# Patient Record
Sex: FEMALE | Race: WHITE | NOT HISPANIC OR LATINO | Employment: FULL TIME | URBAN - METROPOLITAN AREA
[De-identification: names, ages, dates, MRNs, and addresses within clinical notes are randomized per-mention and may not be internally consistent; named-entity substitution may affect disease eponyms.]

---

## 2022-01-27 ENCOUNTER — OFFICE VISIT (OUTPATIENT)
Dept: OBSTETRICS AND GYNECOLOGY | Facility: CLINIC | Age: 57
End: 2022-01-27
Attending: OBSTETRICS & GYNECOLOGY
Payer: COMMERCIAL

## 2022-01-27 ENCOUNTER — LAB VISIT (OUTPATIENT)
Dept: LAB | Facility: HOSPITAL | Age: 57
End: 2022-01-27
Attending: OBSTETRICS & GYNECOLOGY
Payer: COMMERCIAL

## 2022-01-27 VITALS
BODY MASS INDEX: 22.27 KG/M2 | WEIGHT: 125.69 LBS | HEIGHT: 63 IN | DIASTOLIC BLOOD PRESSURE: 72 MMHG | SYSTOLIC BLOOD PRESSURE: 112 MMHG

## 2022-01-27 DIAGNOSIS — Z13.21 ENCOUNTER FOR VITAMIN DEFICIENCY SCREENING: ICD-10-CM

## 2022-01-27 DIAGNOSIS — Z01.419 ENCOUNTER FOR GYNECOLOGICAL EXAMINATION: Primary | ICD-10-CM

## 2022-01-27 DIAGNOSIS — Z78.0 MENOPAUSE: ICD-10-CM

## 2022-01-27 DIAGNOSIS — R53.83 FATIGUE, UNSPECIFIED TYPE: ICD-10-CM

## 2022-01-27 LAB
DHEA-S SERPL-MCNC: 245 UG/DL (ref 29.7–182.2)
ESTRADIOL SERPL-MCNC: <10 PG/ML
FSH SERPL-ACNC: 70.68 MIU/ML
PROGEST SERPL-MCNC: 0.2 NG/ML
TESTOST SERPL-MCNC: 25 NG/DL (ref 5–73)
VIT B12 SERPL-MCNC: 769 PG/ML (ref 210–950)

## 2022-01-27 PROCEDURE — 82607 VITAMIN B-12: CPT | Performed by: OBSTETRICS & GYNECOLOGY

## 2022-01-27 PROCEDURE — 84403 ASSAY OF TOTAL TESTOSTERONE: CPT | Performed by: OBSTETRICS & GYNECOLOGY

## 2022-01-27 PROCEDURE — 83001 ASSAY OF GONADOTROPIN (FSH): CPT | Performed by: OBSTETRICS & GYNECOLOGY

## 2022-01-27 PROCEDURE — 99999 PR PBB SHADOW E&M-NEW PATIENT-LVL III: CPT | Mod: PBBFAC,,, | Performed by: OBSTETRICS & GYNECOLOGY

## 2022-01-27 PROCEDURE — 84402 ASSAY OF FREE TESTOSTERONE: CPT | Performed by: OBSTETRICS & GYNECOLOGY

## 2022-01-27 PROCEDURE — 3008F PR BODY MASS INDEX (BMI) DOCUMENTED: ICD-10-PCS | Mod: CPTII,S$GLB,, | Performed by: OBSTETRICS & GYNECOLOGY

## 2022-01-27 PROCEDURE — 3078F DIAST BP <80 MM HG: CPT | Mod: CPTII,S$GLB,, | Performed by: OBSTETRICS & GYNECOLOGY

## 2022-01-27 PROCEDURE — 3078F PR MOST RECENT DIASTOLIC BLOOD PRESSURE < 80 MM HG: ICD-10-PCS | Mod: CPTII,S$GLB,, | Performed by: OBSTETRICS & GYNECOLOGY

## 2022-01-27 PROCEDURE — 1159F PR MEDICATION LIST DOCUMENTED IN MEDICAL RECORD: ICD-10-PCS | Mod: CPTII,S$GLB,, | Performed by: OBSTETRICS & GYNECOLOGY

## 2022-01-27 PROCEDURE — 3074F PR MOST RECENT SYSTOLIC BLOOD PRESSURE < 130 MM HG: ICD-10-PCS | Mod: CPTII,S$GLB,, | Performed by: OBSTETRICS & GYNECOLOGY

## 2022-01-27 PROCEDURE — 3008F BODY MASS INDEX DOCD: CPT | Mod: CPTII,S$GLB,, | Performed by: OBSTETRICS & GYNECOLOGY

## 2022-01-27 PROCEDURE — 84144 ASSAY OF PROGESTERONE: CPT | Performed by: OBSTETRICS & GYNECOLOGY

## 2022-01-27 PROCEDURE — 82627 DEHYDROEPIANDROSTERONE: CPT | Performed by: OBSTETRICS & GYNECOLOGY

## 2022-01-27 PROCEDURE — 3074F SYST BP LT 130 MM HG: CPT | Mod: CPTII,S$GLB,, | Performed by: OBSTETRICS & GYNECOLOGY

## 2022-01-27 PROCEDURE — 1159F MED LIST DOCD IN RCRD: CPT | Mod: CPTII,S$GLB,, | Performed by: OBSTETRICS & GYNECOLOGY

## 2022-01-27 PROCEDURE — 82670 ASSAY OF TOTAL ESTRADIOL: CPT | Performed by: OBSTETRICS & GYNECOLOGY

## 2022-01-27 PROCEDURE — 99204 OFFICE O/P NEW MOD 45 MIN: CPT | Mod: S$GLB,,, | Performed by: OBSTETRICS & GYNECOLOGY

## 2022-01-27 PROCEDURE — 99204 PR OFFICE/OUTPT VISIT, NEW, LEVL IV, 45-59 MIN: ICD-10-PCS | Mod: S$GLB,,, | Performed by: OBSTETRICS & GYNECOLOGY

## 2022-01-27 PROCEDURE — 99999 PR PBB SHADOW E&M-NEW PATIENT-LVL III: ICD-10-PCS | Mod: PBBFAC,,, | Performed by: OBSTETRICS & GYNECOLOGY

## 2022-01-27 RX ORDER — PROGESTERONE 100 MG/1
CAPSULE ORAL
Qty: 90 CAPSULE | Refills: 3 | Status: SHIPPED | OUTPATIENT
Start: 2022-01-27 | End: 2022-05-23

## 2022-01-27 RX ORDER — ESTRADIOL 1 MG/1
1 TABLET ORAL EVERY MORNING
Qty: 90 TABLET | Refills: 3 | Status: SHIPPED | OUTPATIENT
Start: 2022-01-27 | End: 2022-05-23

## 2022-01-27 RX ORDER — VILAZODONE HYDROCHLORIDE 40 MG/1
40 TABLET ORAL DAILY
COMMUNITY
Start: 2022-01-19

## 2022-01-27 RX ORDER — TRAZODONE HYDROCHLORIDE 150 MG/1
150 TABLET ORAL NIGHTLY
COMMUNITY
Start: 2021-12-14 | End: 2022-05-23

## 2022-01-27 RX ORDER — BREXPIPRAZOLE 2 MG/1
1 TABLET ORAL NIGHTLY
COMMUNITY
Start: 2022-01-17 | End: 2022-05-23

## 2022-01-27 RX ORDER — ZOLPIDEM TARTRATE 10 MG/1
10 TABLET ORAL NIGHTLY PRN
COMMUNITY
Start: 2022-01-15

## 2022-01-27 RX ORDER — ALPRAZOLAM 0.5 MG/1
0.5 TABLET ORAL DAILY PRN
COMMUNITY
Start: 2022-01-15 | End: 2022-05-23

## 2022-01-27 RX ORDER — LEVOTHYROXINE SODIUM 88 UG/1
88 TABLET ORAL DAILY
COMMUNITY
Start: 2022-01-15

## 2022-01-27 RX ORDER — BUPROPION HYDROCHLORIDE 300 MG/1
300 TABLET ORAL DAILY
COMMUNITY
Start: 2022-01-19 | End: 2022-05-23

## 2022-01-27 RX ORDER — ASCORBIC ACID 1000 MG
TABLET ORAL
COMMUNITY

## 2022-01-27 NOTE — PROGRESS NOTES
Subjective:      Andie Kumari is a 56 y.o. female who presents to discuss hormone replacement therapy.  Menarche occurred at age 12 and the patient has a Mirena so uncertain if in menopause. Patient is requesting hormone replacement therapy due to moodiness and no energy, anxiety, decreased libido, depression, memory loss, difficulty focusing, word find problems, and brain fog.  The patient is not currently sexually active.  She denies the following contraindications to HRT:  Vaginal bleeding, history of VTE/PE, thrombophilia,  breast cancer, or active liver disease.   She takes a multivitamin.    PCP: Dr. Owen       Routine labs: 9/2021 - normal per pt  Pap smear: 2021 Normal per pt  Mammogram: 1/2022 Normal   DEXA: 1/2022 Normal  Colonoscopy: 2021 Normal per pt    No visits with results within 3 Month(s) from this visit.   Latest known visit with results is:   No results found for any previous visit.       Past Medical History:   Diagnosis Date    Anxiety     Contraception, device intrauterine 11/18/2014    Mirena     Depression     Encounter for gynecological examination 09/2021    PCP - Dr Francia Owen     H/O mammogram 01/12/2022    Normal @ [facility]     Hx of bone density study 01/2022    Normal @ [facility]     Insomnia     Pap smear for cervical cancer screening 2021    Negative w/ Dr Owen     Thyroid disease      Past Surgical History:   Procedure Laterality Date    COLONOSCOPY  2021    Normal w/ Dr Hightower (10 yrs)     CORRECTION OF HAMMER TOE Right 2002    VAGINAL DELIVERY  2009     Social History     Tobacco Use    Smoking status: Never Smoker    Smokeless tobacco: Never Used   Substance Use Topics    Alcohol use: Yes     Comment: social     Drug use: Never     Family History   Problem Relation Age of Onset    Lung cancer Father     No Known Problems Mother     Suicide Sister     No Known Problems Daughter     Depression Paternal Grandfather     Depression Paternal Grandmother     No  "Known Problems Maternal Grandmother     No Known Problems Maternal Grandfather     Breast cancer Neg Hx     Colon cancer Neg Hx     Ovarian cancer Neg Hx      OB History    Para Term  AB Living   1 1 1     1   SAB IAB Ectopic Multiple Live Births           1      # Outcome Date GA Lbr Johann/2nd Weight Sex Delivery Anes PTL Lv   1 Term  40w0d  3.232 kg (7 lb 2 oz) F Vag-Spont   [facility]A      Obstetric Comments   Menarche 12        Current Outpatient Medications:     buPROPion (WELLBUTRIN XL) 300 MG 24 hr tablet, Take 300 mg by mouth once daily., Disp: , Rfl:     docosahexaenoic acid/epa (FISH OIL ORAL), Take by mouth., Disp: , Rfl:     ginkgo biloba 40 mg Tab, Take by mouth., Disp: , Rfl:     levothyroxine (SYNTHROID) 88 MCG tablet, Take 88 mcg by mouth once daily., Disp: , Rfl:     multivitamin/iron/folic acid (DAILY MULTI ORAL), Take by mouth., Disp: , Rfl:     REXULTI 2 mg Tab, Take 1 tablet by mouth nightly., Disp: , Rfl:     VIIBRYD 40 mg Tab tablet, Take 40 mg by mouth once daily., Disp: , Rfl:     ALPRAZolam (XANAX) 0.5 MG tablet, Take 0.5 mg by mouth daily as needed., Disp: , Rfl:     estradioL (ESTRACE) 1 MG tablet, Take 1 tablet (1 mg total) by mouth every morning., Disp: 90 tablet, Rfl: 3    progesterone (PROMETRIUM) 100 MG capsule, Take 1 capsule by mouth 30-60 minutes before bed every night, Disp: 90 capsule, Rfl: 3    traZODone (DESYREL) 150 MG tablet, Take 150 mg by mouth nightly., Disp: , Rfl:     zolpidem (AMBIEN) 10 mg Tab, Take 10 mg by mouth nightly as needed., Disp: , Rfl:     Vitals:    22 0843   BP: 112/72   Weight: 57 kg (125 lb 10.6 oz)   Height: 5' 3" (1.6 m)   PainSc: 0-No pain     Body mass index is 22.26 kg/m².    Assessment:    Encounter for gynecological examination    Fatigue, unspecified type  -     Vitamin B12; Future; Expected date: 2022    Encounter for vitamin deficiency screening  -     Vitamin B12; Future; Expected date: " 01/27/2022    Menopause  -     DHEA-Sulfate; Future; Expected date: 01/27/2022  -     Estradiol; Future; Expected date: 01/27/2022  -     Follicle Stimulating Hormone; Future; Expected date: 01/27/2022  -     Progesterone; Future; Expected date: 01/27/2022  -     Testosterone; Future; Expected date: 01/27/2022  -     Testosterone, Free; Future; Expected date: 01/27/2022  -     estradioL (ESTRACE) 1 MG tablet; Take 1 tablet (1 mg total) by mouth every morning.  Dispense: 90 tablet; Refill: 3  -     progesterone (PROMETRIUM) 100 MG capsule; Take 1 capsule by mouth 30-60 minutes before bed every night  Dispense: 90 capsule; Refill: 3        Plan:   Risks and benefits of hormone replacement therapy were discussed.  Hormone replacement therapy options, including bioidentical versus non-bioidentical hormones, as well as alternatives discussed.  Labs above ordered.  Release of records done  Start:   Estradiol 1 mg orally QAM.   Progesterone 100 mg orally QPM  Discussed:   Testosterone.  FDA warning for MI, stroke, and DVT reviewed.  Patient is aware this is off-label use     Discussed:   Pregnenolone  mg orally QAM.   DHEA after T optimized   Melatonin after optimized on P4   Vitamin D and Vitamin B12 recommendations after lab results    Follow up in 3 months  Will recheck labs once on typical optimal dose or if having side effects.  Instructed patient to call if she experiences any side effects or has any questions.

## 2022-01-31 LAB — TESTOST FREE SERPL-MCNC: 0.6 PG/ML

## 2022-05-23 ENCOUNTER — OFFICE VISIT (OUTPATIENT)
Dept: OBSTETRICS AND GYNECOLOGY | Facility: CLINIC | Age: 57
End: 2022-05-23
Attending: OBSTETRICS & GYNECOLOGY
Payer: COMMERCIAL

## 2022-05-23 VITALS
WEIGHT: 121.25 LBS | HEIGHT: 63 IN | SYSTOLIC BLOOD PRESSURE: 100 MMHG | DIASTOLIC BLOOD PRESSURE: 60 MMHG | BODY MASS INDEX: 21.48 KG/M2

## 2022-05-23 DIAGNOSIS — Z78.0 MENOPAUSE: Primary | ICD-10-CM

## 2022-05-23 PROCEDURE — 99213 PR OFFICE/OUTPT VISIT, EST, LEVL III, 20-29 MIN: ICD-10-PCS | Mod: S$GLB,,, | Performed by: OBSTETRICS & GYNECOLOGY

## 2022-05-23 PROCEDURE — 99999 PR PBB SHADOW E&M-EST. PATIENT-LVL III: CPT | Mod: PBBFAC,,, | Performed by: OBSTETRICS & GYNECOLOGY

## 2022-05-23 PROCEDURE — 3074F PR MOST RECENT SYSTOLIC BLOOD PRESSURE < 130 MM HG: ICD-10-PCS | Mod: CPTII,S$GLB,, | Performed by: OBSTETRICS & GYNECOLOGY

## 2022-05-23 PROCEDURE — 3078F DIAST BP <80 MM HG: CPT | Mod: CPTII,S$GLB,, | Performed by: OBSTETRICS & GYNECOLOGY

## 2022-05-23 PROCEDURE — 3074F SYST BP LT 130 MM HG: CPT | Mod: CPTII,S$GLB,, | Performed by: OBSTETRICS & GYNECOLOGY

## 2022-05-23 PROCEDURE — 3008F BODY MASS INDEX DOCD: CPT | Mod: CPTII,S$GLB,, | Performed by: OBSTETRICS & GYNECOLOGY

## 2022-05-23 PROCEDURE — 99213 OFFICE O/P EST LOW 20 MIN: CPT | Mod: S$GLB,,, | Performed by: OBSTETRICS & GYNECOLOGY

## 2022-05-23 PROCEDURE — 3008F PR BODY MASS INDEX (BMI) DOCUMENTED: ICD-10-PCS | Mod: CPTII,S$GLB,, | Performed by: OBSTETRICS & GYNECOLOGY

## 2022-05-23 PROCEDURE — 1159F MED LIST DOCD IN RCRD: CPT | Mod: CPTII,S$GLB,, | Performed by: OBSTETRICS & GYNECOLOGY

## 2022-05-23 PROCEDURE — 99999 PR PBB SHADOW E&M-EST. PATIENT-LVL III: ICD-10-PCS | Mod: PBBFAC,,, | Performed by: OBSTETRICS & GYNECOLOGY

## 2022-05-23 PROCEDURE — 1159F PR MEDICATION LIST DOCUMENTED IN MEDICAL RECORD: ICD-10-PCS | Mod: CPTII,S$GLB,, | Performed by: OBSTETRICS & GYNECOLOGY

## 2022-05-23 PROCEDURE — 3078F PR MOST RECENT DIASTOLIC BLOOD PRESSURE < 80 MM HG: ICD-10-PCS | Mod: CPTII,S$GLB,, | Performed by: OBSTETRICS & GYNECOLOGY

## 2022-05-23 RX ORDER — PROGESTERONE 200 MG/1
CAPSULE ORAL
Qty: 90 CAPSULE | Refills: 3 | Status: SHIPPED | OUTPATIENT
Start: 2022-05-23

## 2022-05-23 RX ORDER — ESTRADIOL 2 MG/1
2 TABLET ORAL EVERY MORNING
Qty: 90 TABLET | Refills: 3 | Status: SHIPPED | OUTPATIENT
Start: 2022-05-23 | End: 2022-08-21

## 2022-05-23 RX ORDER — ALPRAZOLAM 1 MG/1
1 TABLET, EXTENDED RELEASE ORAL DAILY
COMMUNITY
Start: 2022-05-11

## 2022-05-23 RX ORDER — MIRTAZAPINE 45 MG/1
45 TABLET, ORALLY DISINTEGRATING ORAL NIGHTLY
COMMUNITY
Start: 2022-05-12

## 2022-05-23 NOTE — PROGRESS NOTES
Subjective:      Andie Kumari is a 56 y.o. female who is here for follow-up of hormone replacement therapy.  At her last visit on 1/27/22, she said she had the Mirena so uncertain if in menopause. Patient complained of moodiness, no energy, anxiety, decreased libido, depression, memory loss, difficulty focusing, word find problems, and brain fog.  The patient was not currently sexually active.  She denied the following contraindications to HRT:  Vaginal bleeding, history of VTE/PE, thrombophilia,  breast cancer, or active liver disease.   She was taking a multivitamin.    PLAN on 1/27/22:  Start:              Estradiol 1 mg orally QAM.              Progesterone 100 mg orally QPM  Discussed:              Testosterone.    She is currently taking the above.  Mood did not improve but has since she started Remeron.  She had the following side effects: none.  Patient denies post-menopausal vaginal bleeding. The patient is not currently sexually active.     No visits with results within 3 Month(s) from this visit.   Latest known visit with results is:   Lab Visit on 01/27/2022   Component Date Value Ref Range Status    DHEA-SO4 01/27/2022 245.0 (A) 29.7 - 182.2 ug/dL Final    Estradiol 01/27/2022 <10 (A) See Text pg/mL Final    Follicle Stimulating Hormone 01/27/2022 70.68  See Text mIU/mL Final    Progesterone 01/27/2022 0.2  See Text ng/mL Final    Testosterone, Total 01/27/2022 25  5 - 73 ng/dL Final    Testosterone, Free 01/27/2022 0.6  pg/mL Final    Vitamin B-12 01/27/2022 769  210 - 950 pg/mL Final       Past Medical History:   Diagnosis Date    Anxiety     Contraception, device intrauterine 11/18/2014    Mirena     Depression     Encounter for gynecological examination 09/2021    PCP - Dr Francia Owen     H/O mammogram 01/12/2022    Normal @ [facility]  (In media)     Hormone replacement therapy (HRT)     Hx of bone density study 01/12/2022    Normal @ [facility] (in media)     Insomnia     Pap smear for  cervical cancer screening     Negative w/ Dr Owen     Thyroid disease      Past Surgical History:   Procedure Laterality Date    COLONOSCOPY      Normal w/ Dr Hightower (10 yrs)     CORRECTION OF HAMMER TOE Right 2002    VAGINAL DELIVERY       Social History     Tobacco Use    Smoking status: Never Smoker    Smokeless tobacco: Never Used   Substance Use Topics    Alcohol use: Yes     Comment: social     Drug use: Never     Family History   Problem Relation Age of Onset    Lung cancer Father     No Known Problems Mother     Suicide Sister     No Known Problems Daughter     Depression Paternal Grandfather     Depression Paternal Grandmother     No Known Problems Maternal Grandmother     No Known Problems Maternal Grandfather     Breast cancer Neg Hx     Colon cancer Neg Hx     Ovarian cancer Neg Hx      OB History    Para Term  AB Living   1 1 1     1   SAB IAB Ectopic Multiple Live Births           1      # Outcome Date GA Lbr Johann/2nd Weight Sex Delivery Anes PTL Lv   1 Term  40w0d  3.232 kg (7 lb 2 oz) F Vag-Spont   [facility]      Obstetric Comments   Menarche 12        Current Outpatient Medications:     ALPRAZolam (XANAX XR) 1 MG Tb24, Take 1 mg by mouth once daily., Disp: , Rfl:     docosahexaenoic acid/epa (FISH OIL ORAL), Take by mouth., Disp: , Rfl:     ginkgo biloba 40 mg Tab, Take by mouth., Disp: , Rfl:     levothyroxine (SYNTHROID) 88 MCG tablet, Take 88 mcg by mouth once daily., Disp: , Rfl:     mirtazapine (REMERON SOL-TAB) 45 MG disintegrating tablet, Take 45 mg by mouth nightly., Disp: , Rfl:     multivitamin/iron/folic acid (DAILY MULTI ORAL), Take by mouth., Disp: , Rfl:     VIIBRYD 40 mg Tab tablet, Take 40 mg by mouth once daily., Disp: , Rfl:     zolpidem (AMBIEN) 10 mg Tab, Take 10 mg by mouth nightly as needed., Disp: , Rfl:     estradioL (ESTRACE) 2 MG tablet, Take 1 tablet (2 mg total) by mouth every morning., Disp: 90 tablet, Rfl: 3     "progesterone (PROMETRIUM) 200 MG capsule, Take 1 capsule by mouth 30-60 minutes before bed every night, Disp: 90 capsule, Rfl: 3    Vitals:    05/23/22 0953   BP: 100/60   Weight: 55 kg (121 lb 4.1 oz)   Height: 5' 3" (1.6 m)   PainSc: 0-No pain     Body mass index is 21.48 kg/m².       Assessment:    Menopause  -     progesterone (PROMETRIUM) 200 MG capsule; Take 1 capsule by mouth 30-60 minutes before bed every night  Dispense: 90 capsule; Refill: 3  -     estradioL (ESTRACE) 2 MG tablet; Take 1 tablet (2 mg total) by mouth every morning.  Dispense: 90 tablet; Refill: 3        Plan:   Risks and benefits of hormone replacement therapy were discussed.  Hormone replacement therapy options, including bioidentical versus non-bioidentical hormones, as well as alternatives discussed.    Increase:  Estradiol to 2 mg orally QAM.  Progesterone to 200 mg orally QPM  Discussed adding magnesium for sleep and mood.  She takes some but had to back down due to diarrhea.  Follow up in 5 months for annual and HRt f/u  Instructed patient to call if she experiences any side effects or has any questions.      "

## 2022-06-16 ENCOUNTER — TELEPHONE (OUTPATIENT)
Dept: OBSTETRICS AND GYNECOLOGY | Facility: CLINIC | Age: 57
End: 2022-06-16
Payer: COMMERCIAL

## 2022-06-16 NOTE — TELEPHONE ENCOUNTER
Pt states she has breast tenderness and bloating since increasing from Estradiol 1mg to Estradiol 2 mg.  Tried it for 3 weeks but no improvement in SE. Decreased the dose to 1mg a couple days ago.  Advised she can decrease to 1mg or 1.5mg.  Will keep us posted.

## 2025-03-12 ENCOUNTER — OFFICE VISIT (OUTPATIENT)
Dept: URGENT CARE | Facility: CLINIC | Age: 60
End: 2025-03-12
Payer: COMMERCIAL

## 2025-03-12 VITALS
TEMPERATURE: 98 F | WEIGHT: 121 LBS | HEIGHT: 63 IN | HEART RATE: 80 BPM | SYSTOLIC BLOOD PRESSURE: 136 MMHG | DIASTOLIC BLOOD PRESSURE: 80 MMHG | BODY MASS INDEX: 21.44 KG/M2 | OXYGEN SATURATION: 99 % | RESPIRATION RATE: 20 BRPM

## 2025-03-12 DIAGNOSIS — J02.9 SORE THROAT: ICD-10-CM

## 2025-03-12 DIAGNOSIS — J06.9 VIRAL URI WITH COUGH: Primary | ICD-10-CM

## 2025-03-12 DIAGNOSIS — R09.81 NASAL CONGESTION: ICD-10-CM

## 2025-03-12 LAB
CTP QC/QA: YES
SARS CORONAVIRUS 2 ANTIGEN: NEGATIVE

## 2025-03-12 PROCEDURE — 87811 SARS-COV-2 COVID19 W/OPTIC: CPT | Mod: QW,S$GLB,, | Performed by: NURSE PRACTITIONER

## 2025-03-12 PROCEDURE — 99203 OFFICE O/P NEW LOW 30 MIN: CPT | Mod: S$GLB,,, | Performed by: NURSE PRACTITIONER

## 2025-03-12 RX ORDER — MELOXICAM 15 MG/1
15 TABLET ORAL DAILY PRN
COMMUNITY
Start: 2024-12-10

## 2025-03-12 RX ORDER — DESVENLAFAXINE 50 MG/1
50 TABLET, FILM COATED, EXTENDED RELEASE ORAL
COMMUNITY
Start: 2025-01-28

## 2025-03-12 RX ORDER — FLUTICASONE PROPIONATE 50 MCG
2 SPRAY, SUSPENSION (ML) NASAL DAILY PRN
Qty: 15.8 ML | Refills: 0 | Status: SHIPPED | OUTPATIENT
Start: 2025-03-12

## 2025-03-12 RX ORDER — TRAZODONE HYDROCHLORIDE 50 MG/1
50 TABLET ORAL NIGHTLY PRN
COMMUNITY
Start: 2025-01-24

## 2025-03-12 RX ORDER — BENZONATATE 100 MG/1
100 CAPSULE ORAL 3 TIMES DAILY PRN
Qty: 30 CAPSULE | Refills: 0 | Status: SHIPPED | OUTPATIENT
Start: 2025-03-12 | End: 2025-03-22

## 2025-03-12 RX ORDER — HYDROXYZINE PAMOATE 25 MG/1
25-50 CAPSULE ORAL NIGHTLY PRN
COMMUNITY
Start: 2024-11-22

## 2025-03-12 NOTE — PROGRESS NOTES
"Subjective:      Patient ID: Andie Kumari is a 59 y.o. female.    Vitals:  height is 5' 3" (1.6 m) and weight is 54.9 kg (121 lb). Her temperature is 98.4 °F (36.9 °C). Her blood pressure is 136/80 and her pulse is 80. Her respiration is 20 and oxygen saturation is 99%.     Chief Complaint: Cough    Pt presents with complaint of cough, sore throat, congestion x5 days.  Pt states her daughter was previously sick x1.5 weeks ago and was tested negative for strep and flu.  Pt states she has been taking dayquil for her symptoms with some relief     59-year-old female presents to clinic with complaints of cough, sore throat, nasal congestion x 5 days treating with Dayquil     Cough  This is a new problem. The current episode started in the past 7 days. The problem has been unchanged. The problem occurs every few minutes. The cough is Non-productive. Associated symptoms include postnasal drip. Pertinent negatives include no chills, fever or myalgias. Nothing aggravates the symptoms. Treatments tried: dayquil. The treatment provided no relief.       Constitution: Negative for chills, sweating, fatigue and fever.   HENT:  Positive for congestion and postnasal drip.    Respiratory:  Positive for cough.    Gastrointestinal:  Negative for abdominal pain, nausea and vomiting.   Musculoskeletal:  Negative for muscle ache.   Allergic/Immunologic: Positive for sneezing.      Objective:     Physical Exam   Constitutional: She is oriented to person, place, and time. She appears well-developed. She is cooperative.  Non-toxic appearance. She does not appear ill. No distress.   HENT:   Head: Normocephalic and atraumatic.   Ears:   Right Ear: Hearing, external ear and ear canal normal. Tympanic membrane is bulging.   Left Ear: Hearing, external ear and ear canal normal. Tympanic membrane is bulging.   Nose: Mucosal edema present. No rhinorrhea or nasal deformity. No epistaxis. Right sinus exhibits no maxillary sinus tenderness and no " frontal sinus tenderness. Left sinus exhibits no maxillary sinus tenderness and no frontal sinus tenderness.   Mouth/Throat: Uvula is midline, oropharynx is clear and moist and mucous membranes are normal. No trismus in the jaw. Normal dentition. No uvula swelling. No oropharyngeal exudate, posterior oropharyngeal edema or posterior oropharyngeal erythema.   Eyes: Conjunctivae and lids are normal. No scleral icterus.   Neck: Trachea normal and phonation normal. Neck supple. No edema present. No erythema present. No neck rigidity present.   Cardiovascular: Normal rate, regular rhythm and normal heart sounds.   Pulmonary/Chest: Effort normal and breath sounds normal. No respiratory distress. She has no decreased breath sounds. She has no rhonchi.   Abdominal: Normal appearance.   Musculoskeletal: Normal range of motion.         General: No deformity. Normal range of motion.   Neurological: She is alert and oriented to person, place, and time. She exhibits normal muscle tone. Coordination normal.   Skin: Skin is warm, dry, intact, not diaphoretic and not pale.   Psychiatric: Her speech is normal and behavior is normal. Judgment and thought content normal.   Nursing note and vitals reviewed.      Assessment:     1. Viral URI with cough    2. Nasal congestion    3. Sore throat      Results for orders placed or performed in visit on 03/12/25   SARS Coronavirus 2 Antigen, POCT Manual Read    Collection Time: 03/12/25  8:57 AM   Result Value Ref Range    SARS Coronavirus 2 Antigen Negative Negative, Presumptive Negative     Acceptable Yes       Plan:       Viral URI with cough  -     SARS Coronavirus 2 Antigen, POCT Manual Read  -     benzonatate (TESSALON) 100 MG capsule; Take 1 capsule (100 mg total) by mouth 3 (three) times daily as needed for Cough.  Dispense: 30 capsule; Refill: 0    Nasal congestion  -     fluticasone propionate (FLONASE) 50 mcg/actuation nasal spray; 2 sprays (100 mcg total) by Each  Nostril route daily as needed for Rhinitis.  Dispense: 15.8 mL; Refill: 0    Sore throat       Please drink plenty of fluids.  Please get plenty of rest.  Please return here or go to the Emergency Department for any concerns or worsening of condition.  It is ok to take over the counter plain Allegra or Claritin or Zyrtec.   If you do have Hypertension or palpitations, it is safe to take Coricidin HBP for relief of sinus symptoms.  We recommend you take Flonase (Fluticasone) or another nasally inhaled steroid unless you are already taking one.  Nasal irrigation with a saline spray or Netti Pot like device per their directions is also recommended.  If not allergic, please take over the counter Tylenol (Acetaminophen) and/or Motrin (Ibuprofen) as directed for control of pain and/or fever.  Please follow up with your primary care doctor or specialist as needed.    If you  smoke, please stop smoking.

## 2025-07-11 ENCOUNTER — HOSPITAL ENCOUNTER (OUTPATIENT)
Dept: RADIOLOGY | Facility: OTHER | Age: 60
Discharge: HOME | End: 2025-07-11
Attending: FAMILY MEDICINE
Payer: COMMERCIAL

## 2025-07-11 DIAGNOSIS — R05.9 COUGH: ICD-10-CM

## 2025-07-11 PROCEDURE — 71046 X-RAY EXAM CHEST 2 VIEWS: CPT | Mod: TC,FY

## 2025-07-11 PROCEDURE — 71046 X-RAY EXAM CHEST 2 VIEWS: CPT | Mod: 26,,, | Performed by: RADIOLOGY
